# Patient Record
Sex: MALE | Race: WHITE | URBAN - METROPOLITAN AREA
[De-identification: names, ages, dates, MRNs, and addresses within clinical notes are randomized per-mention and may not be internally consistent; named-entity substitution may affect disease eponyms.]

---

## 2020-01-09 NOTE — TELEPHONE ENCOUNTER
Patient needs appointment for asthma follow up per documentation 5/2016. TRIAGE CHIEF COMPLAINT:     Nursing and triage notes reviewed    Chief Complaint   Patient presents with   • Sore Throat   • Dental Pain      HPI: Андрей Ortiz is a 51 y.o. male who presents to the emergency department complaining of dental discomfort.  Patient describes a several day history of dental pain.  Patient states seemed worse this evening with some swelling in his right upper jaw.  Patient states he is currently scheduled to have teeth extracted at the end of the month.  Denies changes in vision.  No fever chills.  Still able to open and close mouth without difficulty.    REVIEW OF SYSTEMS: All other systems reviewed and are negative     PAST MEDICAL HISTORY:   Past Medical History:   Diagnosis Date   • Arthritis    • Collar bone fracture    • Head injury due to trauma    • Shoulder fracture, left         FAMILY HISTORY:   History reviewed. No pertinent family history.     SOCIAL HISTORY:   Social History     Socioeconomic History   • Marital status: Single     Spouse name: Not on file   • Number of children: Not on file   • Years of education: Not on file   • Highest education level: Not on file   Tobacco Use   • Smoking status: Never Smoker   • Smokeless tobacco: Never Used   Substance and Sexual Activity   • Alcohol use: No   • Drug use: No        SURGICAL HISTORY:   History reviewed. No pertinent surgical history.     CURRENT MEDICATIONS:      Medication List      ASK your doctor about these medications    acetaminophen 650 MG 8 hr tablet  Commonly known as:  TYLENOL 8 HOUR  Take 1 tablet by mouth Every 8 (Eight) Hours As Needed for Mild Pain .     azithromycin 250 MG tablet  Commonly known as:  ZITHROMAX  Take 2 tablets the first day, then 1 tablet daily for 4 days.     cetirizine 10 MG tablet  Commonly known as:  zyrTEC  Take 1 tablet by mouth Daily.     etodolac 200 MG capsule  Commonly known as:  LODINE  Take 1 capsule by mouth Every 8 (Eight) Hours.     predniSONE 20 MG tablet  Commonly  known as:  DELTASONE  Take 1 tablet by mouth 2 (Two) Times a Day.           ALLERGIES: Patient has no known allergies.     PHYSICAL EXAM:   VITAL SIGNS:   Vitals:    01/09/20 0143   BP: 160/81   Pulse: 64   Resp: 18   Temp: 97.8 °F (36.6 °C)   SpO2: 96%      CONSTITUTIONAL: Awake, oriented, appears non-toxic   HENT: Atraumatic, normocephalic, oral mucosa pink and moist, airway patent. Nares patent without drainage. External ears normal.  Minimal edema of the right upper jaw.  There are very few teeth in the mouth, there is some mild swelling in the right upper jaw there is no obvious fluctuance or sign of abscess.  Normal occlusion of the mouth.  EYES: Conjunctiva clear  NECK: Trachea midline, non-tender, supple   CARDIOVASCULAR: Normal heart rate, Normal rhythm, No murmurs, rubs, gallops   PULMONARY/CHEST: Clear to auscultation, no rhonchi, wheezes, or rales. Symmetrical breath sounds.   ABDOMINAL: Non-distended, soft, non-tender - no rebound or guarding.   NEUROLOGIC: Non-focal, moving all four extremities, no gross sensory or motor deficits.   EXTREMITIES: No clubbing, cyanosis, or edema   SKIN: Warm, Dry, No erythema, No rash     ED COURSE / MEDICAL DECISION MAKING:   Андрей Ortiz is a 51 y.o. male who presents to the emergency department for evaluation of right jaw pain.  Patient does have some mild swelling with some erythema on his jaw.  Concern for likely dental abscess.  Will start patient on antibiotics and bridge him through to seeing his dentist.  Return precautions discussed.    DECISION TO DISCHARGE/ADMIT: see ED care timeline     FINAL IMPRESSION:   1 --dental abscess  2 --   3 --     Electronically signed by: Maggy Hernandez MD, 1/9/2020 2:32 AM       Maggy Hernandez MD  01/09/20 0236

## 2023-09-08 ENCOUNTER — HOSPITAL ENCOUNTER (OUTPATIENT)
Age: 53
Discharge: STILL ADMITTED OR ADMINISTRATIVE | End: 2023-09-08